# Patient Record
(demographics unavailable — no encounter records)

---

## 2024-12-17 NOTE — END OF VISIT
[] : Resident [FreeTextEntry3] : 35-year-old woman with temporal lobe epilepsy inconsistently taking Keppra with ongoing recurrent relatively infrequent stereotyped spells suggestive of auras/focal seizures.  These could be occurring in the setting of an upper respiratory tract infection and in the setting of medication nonadherence.  She states Keppra causes mild sedation she would like to consider different medication.  We discussed adherence to Keppra 500 mg twice a day for now along with vitamin B6 as the adverse effects for mild along with elective admission to EMU for optimization of antiseizure medications next week.

## 2024-12-17 NOTE — PHYSICAL EXAM
[General Appearance - In No Acute Distress] : in no acute distress [General Appearance - Well Nourished] : well nourished [General Appearance - Well Developed] : well developed [Oriented To Time, Place, And Person] : oriented to person, place, and time [Impaired Insight] : insight and judgment were intact [Affect] : the affect was normal [Memory Recent] : recent memory was not impaired [Person] : oriented to person [Place] : oriented to place [Time] : oriented to time [Concentration Intact] : normal concentrating ability [Visual Intact] : visual attention was ~T not ~L decreased [Naming Objects] : no difficulty naming common objects [Repeating Phrases] : no difficulty repeating a phrase [Writing A Sentence] : no difficulty writing a sentence [Fluency] : fluency intact [Comprehension] : comprehension intact [Reading] : reading intact [Past History] : adequate knowledge of personal past history [Cranial Nerves Optic (II)] : visual acuity intact bilaterally,  visual fields full to confrontation, pupils equal round and reactive to light [Cranial Nerves Oculomotor (III)] : extraocular motion intact [Cranial Nerves Trigeminal (V)] : facial sensation intact symmetrically [Cranial Nerves Facial (VII)] : face symmetrical [Cranial Nerves Vestibulocochlear (VIII)] : hearing was intact bilaterally [Cranial Nerves Glossopharyngeal (IX)] : tongue and palate midline [Cranial Nerves Accessory (XI - Cranial And Spinal)] : head turning and shoulder shrug symmetric [Cranial Nerves Hypoglossal (XII)] : there was no tongue deviation with protrusion [Motor Tone] : muscle tone was normal in all four extremities [Motor Strength] : muscle strength was normal in all four extremities [No Muscle Atrophy] : normal bulk in all four extremities [Sensation Tactile Decrease] : light touch was intact [Abnormal Walk] : normal gait [Balance] : balance was intact [2+] : Ankle jerk left 2+ [Sclera] : the sclera and conjunctiva were normal [PERRL With Normal Accommodation] : pupils were equal in size, round, reactive to light, with normal accommodation [Optic Disc Abnormality] : the optic disc were normal in size and color [Outer Ear] : the ears and nose were normal in appearance [Hearing Threshold Finger Rub Not [place]] : hearing was normal [Examination Of The Oral Cavity] : the lips and gums were normal [Both Tympanic Membranes Were Examined] : both tympanic membranes were normal [Nasal Cavity] : the nasal mucosa and septum were normal [Oropharynx] : the oropharynx was normal [Neck Appearance] : the appearance of the neck was normal [Thyroid Diffuse Enlargement] : the thyroid was not enlarged [Respiration, Rhythm And Depth] : normal respiratory rhythm and effort [Auscultation Breath Sounds / Voice Sounds] : lungs were clear to auscultation bilaterally [Apical Impulse] : the apical impulse was normal [Heart Sounds] : normal S1 and S2 [Arterial Pulses Carotid] : carotid pulses were normal with no bruits [Abdominal Aorta] : the abdominal aorta was normal [Arterial Pulses Femoral] : femoral pulses were normal without bruits [Full Pulse] : the pedal pulses are present [Abdomen Tenderness] : non-tender [] : no hepato-splenomegaly [Past-pointing] : there was no past-pointing [Tremor] : no tremor present [Plantar Reflex Right Only] : normal on the right [Plantar Reflex Left Only] : normal on the left

## 2024-12-17 NOTE — ASSESSMENT
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 Collins and Dr. Lopez, TBI when she was 7 yo, started on 2015 in the context of a fever, recently admitted on [facility], EEG and brain MRI unremarkable during the admission, was on Keppra and recently restarted transiently, 5-7 episodes in entire life, semiology is occipital and holocranial headache, then loss of balance, lightheaded, tunnel vision, and then the whole body rigid and shaking, then patient does not remember anything, then feeling tired) and depression; with PFHx of seizures in her mother (was on medications); is here for a recent admission for seizure-like episode. Admitted to [facility] hospital on August 17th, loss of balance and shaking movements, when she was in the ambulance, then she describes losing consciousness. Patient wasn't sleeping well that day. Patient was discharged home after 4 days after being placed on Keppra during the admission tolerating it well. Apparently, patient was not discharged with home Keppra. Then in her apartment experienced 2 more seizures. Previous seizure in 2019.   # Bitemporal epilepsy - Last event in Aug 2024 and November 2024? Possible having frequent auras.   - re-iterated to c/w Keppra 500 mg BID and compliance  - Start Vitamin B6 250 PO (SE of Keppra) - Seizure precautions discussed - Discussed about EMU admission for ASM optimization (will send paperwork for [facility] EMU for elective admission) - Will follow up in 3 months - Discussed risk of SUDEP (sudden unexplained death in epilepsy) with patient  Seizure Safety Instructions 1. [facility] law mandates you to self-report your seizure disorder to [facility], and be seizure-free for 1yr before you can drive.  2. Avoid swimming, diving, taking a bath, cooking, use of motorized machineries. 3. Avoid climbing a ladder, trees or any height. 4. Use machines with safety switches. 5. Always be aware of your surroundings and make sure family and friends are aware of your seizures. 6. Use non-breakable dishes. 7. Consider wearing a medical bracelet to inform people you have epilepsy in case of an emergency.

## 2024-12-17 NOTE — HISTORY OF PRESENT ILLNESS
[FreeTextEntry1] : 34 yo RHF w PMH  with PMHx with seizure disorder. Last seen in September 2024 recommended AEEG.   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  Since last visit AEEG -24h was done: Focal slowing L posterior temporal region w superimposed epileptiform dc consistent with focal bitemporal epilepsy. Had URI w bronchitis, still having ABx (Amoxicillin), cough meds.   Events: she feels she had 1-2 events in the last weeks of November 2024 when she was having her URI: feels tired, and in one occasion passed out  Very frequent (1-2 times a week) feels some episodes of "hearing things", anxiety, fear and "weird sensation"    Feeling she needs therapist although she goes to [employer] regularly.    Previous chart: TBI when she was 6 y old. Her sz started on 2015 in the context of a fever, recently admitted on [facility], EEG and brain MRI unremarkable during the admission, was on Keppra and recently restarted transiently, 5-7 episodes in entire life, semiology is occipital and holocephalic headache, then loss of balance, lightheaded, tunnel vision, and then the whole body rigid and shaking, then patient does not remember anything, then feeling tired) and depression; with PFHx of seizures in her mother (was on medications) is here for a recent admission for seizure-like episode.. She was taking 500 mg of Keppra but stopped taking Keppra in 2019 (last seizure October 2016). Her sister - may has some seizures as well 2/2 TBI as well.   Non-compliant w Keppra completely she may miss 1-2 days, and last time 4 d ago, and hasn't took it today, usually takes once a day.

## 2025-03-21 NOTE — HISTORY OF PRESENT ILLNESS
[FreeTextEntry1] : 35 yo RHF w PMH  with PMHx with seizure disorder. Last seen in Dec 2024, was admitted to EMU ([facility]) for 5 days, was recorded one event lasting 20 min. w R hand shaking w aphasia w/o EEG correlate w interictal bitemporal sharp waves. Recommended psychiatry consults and MRI brain w seizure protocol. Currently /750. States she is compliant w Keppra BID completely she may miss 1 times, last week.      Previous notes:  Seizures most likely focal temporal epilepsy w focal ideomotor and convulsive events. Since last visit the AEEG -24h was done: Focal slowing L posterior temporal region w superimposed epileptiform dc consistent with focal bitemporal epilepsy. Had URI w bronchitis, still having ABx (Amoxicillin), cough meds.   Events: she feels she had 1-2 events in the last weeks of November 2024 when she was having her URI: feels tired, and in one occasion passed out. Very frequent (1-2 times a week) feels some episodes of "hearing things", anxiety, fear and "weird sensation". Feeling she needs therapist although she goes to [employer] regularly.    Previous chart: TBI when she was 6 y old. Her sz started on 2015 in the context of a fever, recently admitted on [facility], EEG and brain MRI unremarkable during the admission, was on Keppra and recently restarted transiently, 5-7 episodes in entire life, semiology is occipital and holocephalic headache, then loss of balance, lightheaded, tunnel vision, and then the whole body rigid and shaking, then patient does not remember anything, then feeling tired) and depression; with PFHx of seizures in her mother (was on medications) is here for a recent admission for seizure-like episode.. She was taking 500 mg of Keppra but stopped taking Keppra in 2019 (last seizure October 2016). Her sister - may has some seizures as well 2/2 TBI as well.

## 2025-03-21 NOTE — PHYSICAL EXAM
[General Appearance - In No Acute Distress] : in no acute distress [General Appearance - Well Nourished] : well nourished [General Appearance - Well Developed] : well developed [Oriented To Time, Place, And Person] : oriented to person, place, and time [Impaired Insight] : insight and judgment were intact [Affect] : the affect was normal [Memory Recent] : recent memory was not impaired [Person] : oriented to person [Place] : oriented to place [Time] : oriented to time [Concentration Intact] : normal concentrating ability [Visual Intact] : visual attention was ~T not ~L decreased [Naming Objects] : no difficulty naming common objects [Repeating Phrases] : no difficulty repeating a phrase [Writing A Sentence] : no difficulty writing a sentence [Fluency] : fluency intact [Comprehension] : comprehension intact [Reading] : reading intact [Past History] : adequate knowledge of personal past history [Cranial Nerves Optic (II)] : visual acuity intact bilaterally,  visual fields full to confrontation, pupils equal round and reactive to light [Cranial Nerves Oculomotor (III)] : extraocular motion intact [Cranial Nerves Trigeminal (V)] : facial sensation intact symmetrically [Cranial Nerves Facial (VII)] : face symmetrical [Cranial Nerves Vestibulocochlear (VIII)] : hearing was intact bilaterally [Cranial Nerves Glossopharyngeal (IX)] : tongue and palate midline [Cranial Nerves Accessory (XI - Cranial And Spinal)] : head turning and shoulder shrug symmetric [Cranial Nerves Hypoglossal (XII)] : there was no tongue deviation with protrusion [Motor Tone] : muscle tone was normal in all four extremities [Motor Strength] : muscle strength was normal in all four extremities [No Muscle Atrophy] : normal bulk in all four extremities [Sensation Tactile Decrease] : light touch was intact [Abnormal Walk] : normal gait [Balance] : balance was intact [2+] : Ankle jerk left 2+ [Sclera] : the sclera and conjunctiva were normal [PERRL With Normal Accommodation] : pupils were equal in size, round, reactive to light, with normal accommodation [Optic Disc Abnormality] : the optic disc were normal in size and color [Outer Ear] : the ears and nose were normal in appearance [Hearing Threshold Finger Rub Not [place]] : hearing was normal [Examination Of The Oral Cavity] : the lips and gums were normal [Both Tympanic Membranes Were Examined] : both tympanic membranes were normal [Nasal Cavity] : the nasal mucosa and septum were normal [Oropharynx] : the oropharynx was normal [Neck Appearance] : the appearance of the neck was normal [Thyroid Diffuse Enlargement] : the thyroid was not enlarged [Respiration, Rhythm And Depth] : normal respiratory rhythm and effort [Auscultation Breath Sounds / Voice Sounds] : lungs were clear to auscultation bilaterally [Apical Impulse] : the apical impulse was normal [Heart Sounds] : normal S1 and S2 [Arterial Pulses Carotid] : carotid pulses were normal with no bruits [Abdominal Aorta] : the abdominal aorta was normal [Arterial Pulses Femoral] : femoral pulses were normal without bruits [Full Pulse] : the pedal pulses are present [Abdomen Tenderness] : non-tender [] : no hepato-splenomegaly [Past-pointing] : there was no past-pointing [Tremor] : no tremor present [Plantar Reflex Right Only] : normal on the right [Plantar Reflex Left Only] : normal on the left

## 2025-03-21 NOTE — ASSESSMENT
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 Collins and Dr. Lopez, TBI when she was 7 yo, started on 2015 in the context of a fever, recently admitted on [facility], EEG and brain MRI unremarkable during the admission, was on Keppra and recently restarted, 5-7 episodes in entire life, semiology is occipital and holocranial headache, then loss of balance, lightheaded, tunnel vision, and then the whole body rigid and shaking, then patient does not remember anything, then feeling tired) and depression; with PFHx of seizures in her mother (was on medications); is here for a recent admission for seizure-like episode. Admitted to [facility] hospital on August 17th, loss of balance and shaking movements, when she was in the ambulance, then she describes losing consciousness. Patient wasn't sleeping well that day. Patient was discharged home after 4 days after being placed on Keppra during the admission tolerating it well. Apparently, patient was not discharged with home Keppra. Then in her apartment experienced 2 more seizures. Previous seizure in 2019. Recently was admitted to [facility] EMU w captured one non-epileptic event on the settings of bitemporal interictal discharges.   # Bitemporal epilepsy, PNES - Last event in January 2025, frequent PNES, one was recorded on VEEG during last admission Jan 29, 2025. Recommended psychiatry, still have epileptogenic potential on EEG, so we c/w /750, MRI brain w epilepsy protocol.  - c/w Keppra 500 mg AM and 750 mg PM   - Psychiatry and therapy referral - Continue Vitamin B6 250 PO (SE of Keppra) - Will follow up in 3-4 months. (the pt instructed to bring MRI report and CD from [facility]) - if the images suboptimal,  considering repeat MRI brain w epilepsy protocol  - Discussed risk of SUDEP (sudden unexplained death in epilepsy) with patient.   Seizure Safety Instructions 1. [facility] law mandates you to self-report your seizure disorder to [facility], and be seizure-free for 1yr before you can drive.  2. Avoid swimming, diving, taking a bath, cooking, use of motorized machineries. 3. Avoid climbing a ladder, trees or any height. 4. Use machines with safety switches. 5. Always be aware of your surroundings and make sure family and friends are aware of your seizures. 6. Use non-breakable dishes. 7. Consider wearing a medical bracelet to inform people you have epilepsy in case of an emergency.   #Back pain  - PT referral  RTC - 3-4 mo

## 2025-03-21 NOTE — ASSESSMENT
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 Collins and Dr. Lopez, TBI when she was 7 yo, started on 2015 in the context of a fever, recently admitted on [facility], EEG and brain MRI unremarkable during the admission, was on Keppra and recently restarted, 5-7 episodes in entire life, semiology is occipital and holocranial headache, then loss of balance, lightheaded, tunnel vision, and then the whole body rigid and shaking, then patient does not remember anything, then feeling tired) and depression; with PFHx of seizures in her mother (was on medications); is here for a recent admission for seizure-like episode. Admitted to [facility] hospital on August 17th, loss of balance and shaking movements, when she was in the ambulance, then she describes losing consciousness. Patient wasn't sleeping well that day. Patient was discharged home after 4 days after being placed on Keppra during the admission tolerating it well. Apparently, patient was not discharged with home Keppra. Then in her apartment experienced 2 more seizures. Previous seizure in 2019. Recently was admitted to [facility] EMU w captured one non-epileptic event on the settings of bitemporal interictal discharges.   # Bitemporal epilepsy, PNES - Last event in January 2025, frequent PNES, one was recorded on VEEG during last admission Jan 29, 2025. Recommended psychiatry, still have epileptogenic potential on EEG, so we c/w /750, MRI brain w epilepsy protocol.  - c/w Keppra 500 mg AM and 750 mg PM   - Psychiatry and therapy referral - Continue Vitamin B6 250 PO (SE of Keppra) - Will follow up in 3-4 months. (the pt instructed to bring MRI report and CD from [facility]) - if the images suboptimal,  considering repeat MRI brain w epilepsy protocol  - Discussed risk of SUDEP (sudden unexplained death in epilepsy) with patient.   Seizure Safety Instructions 1. [facility] law mandates you to self-report your seizure disorder to [facility], and be seizure-free for 1yr before you can drive.  2. Avoid swimming, diving, taking a bath, cooking, use of motorized machineries. 3. Avoid climbing a ladder, trees or any height. 4. Use machines with safety switches. 5. Always be aware of your surroundings and make sure family and friends are aware of your seizures. 6. Use non-breakable dishes. 7. Consider wearing a medical bracelet to inform people you have epilepsy in case of an emergency.   #Back pain  - PT referral  RTC - 3-4 mo

## 2025-03-21 NOTE — HISTORY OF PRESENT ILLNESS
[FreeTextEntry1] : 37 yo RHF w PMH  with PMHx with seizure disorder. Last seen in Dec 2024, was admitted to EMU ([facility]) for 5 days, was recorded one event lasting 20 min. w R hand shaking w aphasia w/o EEG correlate w interictal bitemporal sharp waves. Recommended psychiatry consults and MRI brain w seizure protocol. Currently /750. States she is compliant w Keppra BID completely she may miss 1 times, last week.      Previous notes:  Seizures most likely focal temporal epilepsy w focal ideomotor and convulsive events. Since last visit the AEEG -24h was done: Focal slowing L posterior temporal region w superimposed epileptiform dc consistent with focal bitemporal epilepsy. Had URI w bronchitis, still having ABx (Amoxicillin), cough meds.   Events: she feels she had 1-2 events in the last weeks of November 2024 when she was having her URI: feels tired, and in one occasion passed out. Very frequent (1-2 times a week) feels some episodes of "hearing things", anxiety, fear and "weird sensation". Feeling she needs therapist although she goes to [employer] regularly.    Previous chart: TBI when she was 6 y old. Her sz started on 2015 in the context of a fever, recently admitted on [facility], EEG and brain MRI unremarkable during the admission, was on Keppra and recently restarted transiently, 5-7 episodes in entire life, semiology is occipital and holocephalic headache, then loss of balance, lightheaded, tunnel vision, and then the whole body rigid and shaking, then patient does not remember anything, then feeling tired) and depression; with PFHx of seizures in her mother (was on medications) is here for a recent admission for seizure-like episode.. She was taking 500 mg of Keppra but stopped taking Keppra in 2019 (last seizure October 2016). Her sister - may has some seizures as well 2/2 TBI as well.

## 2025-03-21 NOTE — PHYSICAL EXAM
[General Appearance - In No Acute Distress] : in no acute distress [General Appearance - Well Nourished] : well nourished [General Appearance - Well Developed] : well developed [Oriented To Time, Place, And Person] : oriented to person, place, and time [Impaired Insight] : insight and judgment were intact [Affect] : the affect was normal [Memory Recent] : recent memory was not impaired [Person] : oriented to person [Place] : oriented to place [Time] : oriented to time [Concentration Intact] : normal concentrating ability [Visual Intact] : visual attention was ~T not ~L decreased [Naming Objects] : no difficulty naming common objects [Repeating Phrases] : no difficulty repeating a phrase [Writing A Sentence] : no difficulty writing a sentence [Fluency] : fluency intact [Comprehension] : comprehension intact [Reading] : reading intact [Past History] : adequate knowledge of personal past history [Cranial Nerves Optic (II)] : visual acuity intact bilaterally,  visual fields full to confrontation, pupils equal round and reactive to light [Cranial Nerves Oculomotor (III)] : extraocular motion intact [Cranial Nerves Trigeminal (V)] : facial sensation intact symmetrically [Cranial Nerves Facial (VII)] : face symmetrical [Cranial Nerves Vestibulocochlear (VIII)] : hearing was intact bilaterally [Cranial Nerves Glossopharyngeal (IX)] : tongue and palate midline [Cranial Nerves Accessory (XI - Cranial And Spinal)] : head turning and shoulder shrug symmetric [Cranial Nerves Hypoglossal (XII)] : there was no tongue deviation with protrusion [Motor Tone] : muscle tone was normal in all four extremities [Motor Strength] : muscle strength was normal in all four extremities [No Muscle Atrophy] : normal bulk in all four extremities [Sensation Tactile Decrease] : light touch was intact [Abnormal Walk] : normal gait [Balance] : balance was intact [2+] : Ankle jerk left 2+ [Sclera] : the sclera and conjunctiva were normal [PERRL With Normal Accommodation] : pupils were equal in size, round, reactive to light, with normal accommodation [Optic Disc Abnormality] : the optic disc were normal in size and color [Outer Ear] : the ears and nose were normal in appearance [Hearing Threshold Finger Rub Not [place]] : hearing was normal [Examination Of The Oral Cavity] : the lips and gums were normal [Both Tympanic Membranes Were Examined] : both tympanic membranes were normal [Nasal Cavity] : the nasal mucosa and septum were normal [Oropharynx] : the oropharynx was normal [Neck Appearance] : the appearance of the neck was normal [Thyroid Diffuse Enlargement] : the thyroid was not enlarged [Respiration, Rhythm And Depth] : normal respiratory rhythm and effort [Auscultation Breath Sounds / Voice Sounds] : lungs were clear to auscultation bilaterally [Apical Impulse] : the apical impulse was normal [Heart Sounds] : normal S1 and S2 [Arterial Pulses Carotid] : carotid pulses were normal with no bruits [Abdominal Aorta] : the abdominal aorta was normal [Arterial Pulses Femoral] : femoral pulses were normal without bruits [Full Pulse] : the pedal pulses are present [Abdomen Tenderness] : non-tender [] : no hepato-splenomegaly [Past-pointing] : there was no past-pointing [Tremor] : no tremor present [Plantar Reflex Right Only] : normal on the right [Plantar Reflex Left Only] : normal on the left

## 2025-04-21 NOTE — PSYCHOSOCIAL ASSESSMENT
[Yes (select details below)] : Have you ever experienced this type of event? Yes [had nightmares about the event(s) or thought about the event(s) when you did not want] : had nightmares and/or unwanted thoughts about the events [tried hard not to think about the event(s) or went out of your way to avoid situations that reminded you of the event] : tried hard to avoid thinking about events or avoid situations that reminded patient of the event [felt numb or detached from people, activities, or your surrounding] : has felt numb or detached from people, activities, or surroundings [felt guilty or unable to stop blaming yourself or others for the event(s) or any problems the event(s) may have caused] : has felt guilty or unable to stop blaming self or others for event(s), or any problems the event(s) may have caused [Client's siblings] : client's siblings [No] : Patient has personal representation (legal guardian, representative payee, conservatorship)? No [has been constantly on guard, watchful, or easily startled] : has not been constantly on guard, watchful, or easily startled [Other: ____] : [unfilled] [FreeTextEntry1] : not working  [FreeTextEntry7] : Laurel

## 2025-04-21 NOTE — PAST MEDICAL HISTORY
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

## 2025-04-21 NOTE — SOCIAL HISTORY
[FreeTextEntry1] : Legal Status  Does individual Served have a Legal Guardian, rep Payee or Conservatorship?  [ x] No  [ ] Yes - Details: [ ]     Legal Involvement history  Does the individual have a history of or current involvement with the legal system?  [ x] No  [ ] Yes - Details: [ ]      Services  Have you ever served in the ?  [ x] No  [ ] Yes - Details: [ ]     Employment history [ ]     Developmental history [ ] no issues    Sexual hx/identity Sexual History/ Concern (include sexual orientation and other relevant information)  [ ]   heterosexual Race - ethnicity - culture information [ ]  Kazakh     Social supports (friends, Volunteers, club, AA meeting, other meetings ) ? [ ] family    Meaningful Activities: [ ] music       Spiritual Assessment Tool - ABELARDO BORJA What is your gabe or belief? [ ] Pentecostalism/Yarsani  Do you consider yourself spiritual or Jainism? [ ] both Is there something you believe in that gives meaning to your life? [ ] god I: Is it important in your life? [ ] yes What influence does it have on how you take care of yourself? [ ] god How have your beliefs influenced your behavior during this illness? What role do your beliefs play in regaining your health? [ ] dont know C. Are you part of a spiritual or Jainism community? [ ] yes Is this of support to you and how? [ ] yes, i also pray daily Is there a person or group of people you really love or who are really important to you? [ ] family and some friends H. We have been discussing your belief and supports. What else gives you internal support?[ ] god What are your sources of hope, strength, comfort and peace? [ ] god What do you hold on to during difficult times? [ ] god what sustains you and keeps you going? [ ] god A. How would you like me, your healthcare provider, to address these issues in your healthcare? [ ]   " i want to get better"  SMOKING CESSATION  Do you Smoke?                              [ ] Yes [ x] No  Do you want to quit? [ ] Yes [ ] No  -ASK   Number of cigatettes   [ ] cigars [ ]  pipe bowls [ ]  per day   Number of ST cans/ pouches per week [ ]   Number of years used [ ]   How soon after you wake up do you use tobacco?  [ ] within 30 minutes      [ ] more than 30 minutes   Previous quit attempts:  # of attempts [ ] longest quit period [ ] methods(s) used [ ]  How long ago was last attempt to quit  [ ] years [ ] months   Reasons for wanting to quit[ ]  -ADVISE   about the oral benefits of quitting  -ASSESS   willingness to make a quit attempt (Stage of Change)     [ ] Precontemplation (Stop here & Reassess next visit) [ ] Contemplation [ ] Preparation    -ASSIST    (depending on stage of change)   Self-Help Pamphlets & Materials   List of local community group/individual quit programs and phone help lines   Encourage a quit date (for those who are ready)   Pharmacotherapy: Nicotine gum/ Lozenge/ Patch/ Inhaler/NS/Zyban/ Chantix    RX: [ ]  ()  -ARRANGE  Follow up if set a quit date (with permission)  Quit Date: [ ]  Phone calls or visits:  [ ] Week 1-2  Months   [ ] 1   [ ] 3   [ ] 6   [ ] 12    -Yearly Reassessment    [ ] No Changes of Smoking [ ] Change of Smoking Habit (Non-Smoker to Smoker/ Smoker to Non Smoker)  (If there is change from Non Smoker to Smoker, please fill out new BRIEF TOBACCO CESSATION INTERVENTION FORM)  Date of Yearly Reassessment : [ ]  Comments:  [ ]

## 2025-04-21 NOTE — FAMILY HISTORY
[FreeTextEntry1] : Family composition: [ ] The patient resides with her sister Family history and background [ ] the patient's parents resided in [place]; the patient lives with sister and has two sisters and brother.  Family relationship [ ] ok The patient is very close to her family.  Pertinent Family Medical, MH and Substance Use History including Adult Child of Alcoholic and child of substance abuse status; history of cancer and heart disease none
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
## 2025-04-21 NOTE — PHYSICAL EXAM
[6 - Severely ill] : 6 - Severely ill  (disruptive pathology, behavior and function are frequently influenced by symptoms, may require assistance from others) [Individual reports tobacco use during the last 30 days?] : Individual reports tobacco use during the last 30 days? No [Individual reports use of the following tobacco products during the last 30 days?] : Individual reports use of the following tobacco products during the last 30 days? No [Individual reports current use of tobacco cessation medication or nicotine replacement therapy?] : Individual reports current use of tobacco cessation medication or nicotine replacement therapy? No [Was tobacco cessation medication and/or nicotine replacement therapy recommended?] : Was tobacco cessation medication and/or nicotine replacement therapy recommended? No [Does individual accept referral to MD for cessation medication or NRT?] : Does individual accept referral to MD for cessation medication or NRT? No
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
## 2025-04-21 NOTE — HISTORY OF PRESENT ILLNESS
[FreeTextEntry1] : The patient stated that when she was 5 she fell and was take to the hospital for a " head surgery" that was back in [place]
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
## 2025-04-21 NOTE — REASON FOR VISIT
[Not Applicable] : Not Applicable [Patient] : Patient [FreeTextEntry4] : 12pm [Number can be texted] : number can be texted [OK  to leave message] : OK  to leave message [FreeTextEntry5] : English [FreeTextEntry2] : depression, anxiety and panic attacks [FreeTextEntry1] : depression, anxiety, panic attacks, poor focus and memory

## 2025-04-21 NOTE — SOCIAL HISTORY
[FreeTextEntry1] : Legal Status  Does individual Served have a Legal Guardian, rep Payee or Conservatorship?  [ x] No  [ ] Yes - Details: [ ]     Legal Involvement history  Does the individual have a history of or current involvement with the legal system?  [ x] No  [ ] Yes - Details: [ ]      Services  Have you ever served in the ?  [ x] No  [ ] Yes - Details: [ ]     Employment history [ ]     Developmental history [ ] no issues    Sexual hx/identity Sexual History/ Concern (include sexual orientation and other relevant information)  [ ]   heterosexual Race - ethnicity - culture information [ ]  Algerian     Social supports (friends, Volunteers, club, AA meeting, other meetings ) ? [ ] family    Meaningful Activities: [ ] music       Spiritual Assessment Tool - ABELARDO BORJA What is your gabe or belief? [ ] Religious/Church  Do you consider yourself spiritual or Taoist? [ ] both Is there something you believe in that gives meaning to your life? [ ] god I: Is it important in your life? [ ] yes What influence does it have on how you take care of yourself? [ ] god How have your beliefs influenced your behavior during this illness? What role do your beliefs play in regaining your health? [ ] dont know C. Are you part of a spiritual or Taoist community? [ ] yes Is this of support to you and how? [ ] yes, i also pray daily Is there a person or group of people you really love or who are really important to you? [ ] family and some friends H. We have been discussing your belief and supports. What else gives you internal support?[ ] god What are your sources of hope, strength, comfort and peace? [ ] god What do you hold on to during difficult times? [ ] god what sustains you and keeps you going? [ ] god A. How would you like me, your healthcare provider, to address these issues in your healthcare? [ ]   " i want to get better"  SMOKING CESSATION  Do you Smoke?                              [ ] Yes [ x] No  Do you want to quit? [ ] Yes [ ] No  -ASK   Number of cigatettes   [ ] cigars [ ]  pipe bowls [ ]  per day   Number of ST cans/ pouches per week [ ]   Number of years used [ ]   How soon after you wake up do you use tobacco?  [ ] within 30 minutes      [ ] more than 30 minutes   Previous quit attempts:  # of attempts [ ] longest quit period [ ] methods(s) used [ ]  How long ago was last attempt to quit  [ ] years [ ] months   Reasons for wanting to quit[ ]  -ADVISE   about the oral benefits of quitting  -ASSESS   willingness to make a quit attempt (Stage of Change)     [ ] Precontemplation (Stop here & Reassess next visit) [ ] Contemplation [ ] Preparation    -ASSIST    (depending on stage of change)   Self-Help Pamphlets & Materials   List of local community group/individual quit programs and phone help lines   Encourage a quit date (for those who are ready)   Pharmacotherapy: Nicotine gum/ Lozenge/ Patch/ Inhaler/NS/Zyban/ Chantix    RX: [ ]  ()  -ARRANGE  Follow up if set a quit date (with permission)  Quit Date: [ ]  Phone calls or visits:  [ ] Week 1-2  Months   [ ] 1   [ ] 3   [ ] 6   [ ] 12    -Yearly Reassessment    [ ] No Changes of Smoking [ ] Change of Smoking Habit (Non-Smoker to Smoker/ Smoker to Non Smoker)  (If there is change from Non Smoker to Smoker, please fill out new BRIEF TOBACCO CESSATION INTERVENTION FORM)  Date of Yearly Reassessment : [ ]  Comments:  [ ]
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
## 2025-04-28 NOTE — DISCUSSION/SUMMARY
[FreeTextEntry1] : The patient missed her initial intake appointment with the psychiatrist, and it was rescheduled. The therapist contacted the patient to remind her of her virtual therapy session tomorrow, 4/29/25 at 11 am. The patient didn't  her phone, the therapist left a voicemail with the contact info as well as her options for tomorrow. The therapist also read the SW note in regard to possible metro cards for her MH appointments. The therapist spoke to the , unfortunately the patient is not eligible for metro cards since she is not a Medicaid patient.

## 2025-04-30 NOTE — PLAN
[FreeTextEntry2] : will develop during the upcoming sessions  [Cognitive Processing Therapy] : Cognitive Processing Therapy  [Interpersonal Therapy] : Interpersonal Therapy  [Motivational Interviewing] : Motivational Interviewing  [Psychodynamic Therapy] : Psychodynamic Therapy  [Psychoeducation] : Psychoeducation  [Skills training (all types)] : Skills training (all types)  [Supportive Therapy] : Supportive Therapy [de-identified] : The patient came in person for the session. The patient reported that the reason she missed the session as well as her psych intake was becuse she got confused. The patient has a hard time focusing, has to stop and look for words and feels uncomfortable while she can't express herself. The patient also spoke about her younger years and the fact that she never finished high school, was taking classes to get to retake the exams but didn't finish it either. One of the patient's goals is to get her GED. Also, the patient tried to go back to work for a day and stated that could only do a couple of hours because she felt dizzy and weak. In addtion, the patient spoke about her expectations and irritability, gave an example of texting someone and not getting text back as fast as she expected. The patient was very upset that the person didn't text her back. Additionally, the patient worries that her brother is drinking alcohol, and she thinks that her sister's relationship with her spouse is in trouble. During the session the therapist and the patient discussed ways for the patient to improve her memory, the therapist will provide printed homework for the patient to complete. The patient takes medications as prescribed. Throughout the session the patient was provided with active listening, motivational interviewing, emotional support and empathy.  [Recommended Frequency of Visits: ____] : Recommended frequency of visits: [unfilled] [Return in ____ week(s)] : Return in [unfilled] week(s) [FreeTextEntry1] : the therapist will see the patient every 2-3 weeks

## 2025-04-30 NOTE — PLAN
[FreeTextEntry2] : will develop during the upcoming sessions  [Cognitive Processing Therapy] : Cognitive Processing Therapy  [Interpersonal Therapy] : Interpersonal Therapy  [Motivational Interviewing] : Motivational Interviewing  [Psychodynamic Therapy] : Psychodynamic Therapy  [Psychoeducation] : Psychoeducation  [Skills training (all types)] : Skills training (all types)  [Supportive Therapy] : Supportive Therapy [de-identified] : The patient came in person for the session. The patient reported that the reason she missed the session as well as her psych intake was becuse she got confused. The patient has a hard time focusing, has to stop and look for words and feels uncomfortable while she can't express herself. The patient also spoke about her younger years and the fact that she never finished high school, was taking classes to get to retake the exams but didn't finish it either. One of the patient's goals is to get her GED. Also, the patient tried to go back to work for a day and stated that could only do a couple of hours because she felt dizzy and weak. In addtion, the patient spoke about her expectations and irritability, gave an example of texting someone and not getting text back as fast as she expected. The patient was very upset that the person didn't text her back. Additionally, the patient worries that her brother is drinking alcohol, and she thinks that her sister's relationship with her spouse is in trouble. During the session the therapist and the patient discussed ways for the patient to improve her memory, the therapist will provide printed homework for the patient to complete. The patient takes medications as prescribed. Throughout the session the patient was provided with active listening, motivational interviewing, emotional support and empathy.  [Recommended Frequency of Visits: ____] : Recommended frequency of visits: [unfilled] [Return in ____ week(s)] : Return in [unfilled] week(s) [FreeTextEntry1] : the therapist will see the patient every 2-3 weeks

## 2025-04-30 NOTE — REASON FOR VISIT
[FreeTextEntry4] : 11am [FreeTextEntry5] : [id number] [Patient] : Patient [FreeTextEntry1] : therapy f/u

## 2025-04-30 NOTE — REASON FOR VISIT
[FreeTextEntry4] : 11am [FreeTextEntry5] : [id number] [Patient] : Patient [FreeTextEntry1] : therapy f/u

## 2025-05-01 NOTE — RISK ASSESSMENT
[Clinical Interview] : Clinical Interview [No] : No [Mood disorder] : mood disorder [Severe anxiety, agitation or panic] : severe anxiety, agitation or panic [Chronic pain/other acute medical condition] : chronic pain or other acute medical condition [Identifies reasons for living] : identifies reasons for living [Yazdanism beliefs] : Church beliefs [Cultural, spiritual and/or moral attitudes against suicide] : cultural, spiritual and/or moral attitudes against suicide [None Known] : none known [No known risk factors] : No known risk factors [Residential stability] : residential stability [Sobriety] : sobriety

## 2025-05-01 NOTE — RISK ASSESSMENT
[Clinical Interview] : Clinical Interview [No] : No [Mood disorder] : mood disorder [Severe anxiety, agitation or panic] : severe anxiety, agitation or panic [Chronic pain/other acute medical condition] : chronic pain or other acute medical condition [Identifies reasons for living] : identifies reasons for living [Lutheran beliefs] : Cheondoism beliefs [Cultural, spiritual and/or moral attitudes against suicide] : cultural, spiritual and/or moral attitudes against suicide [None Known] : none known [No known risk factors] : No known risk factors [Residential stability] : residential stability [Sobriety] : sobriety

## 2025-05-01 NOTE — RISK ASSESSMENT
[Clinical Interview] : Clinical Interview [No] : No [Mood disorder] : mood disorder [Severe anxiety, agitation or panic] : severe anxiety, agitation or panic [Chronic pain/other acute medical condition] : chronic pain or other acute medical condition [Identifies reasons for living] : identifies reasons for living [Rastafari beliefs] : Sabianism beliefs [Cultural, spiritual and/or moral attitudes against suicide] : cultural, spiritual and/or moral attitudes against suicide [None Known] : none known [No known risk factors] : No known risk factors [Residential stability] : residential stability [Sobriety] : sobriety

## 2025-05-02 NOTE — DISCUSSION/SUMMARY
[Low acute suicide risk] : Low acute suicide risk [No] : No [FreeTextEntry1] : Writer left VM letting pt know that given her history of stroke, she'd benefit from follow up with PCP to determine if she has an underlying clotting disorder.

## 2025-05-02 NOTE — REVIEW OF SYSTEMS
[Negative] : Allergic/Immunologic [de-identified] : occasional dizziness, seizures [de-identified] : see note

## 2025-05-02 NOTE — SOCIAL HISTORY
[FreeTextEntry1] : Has two sisters, she is the youngest. 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 Was previously taking classes at a college to finish her high school degree but stopped 2/2 worsening anxiety. Plays guOptifreezer for fun. Has friends and supportive colleagues at work. Self identifies as Church. Originally from [place]. Currently single, no dependents, last had a long term relationship 3 years ago.

## 2025-05-02 NOTE — PHYSICAL EXAM
[None] : none [Well groomed] : well groomed [Cooperative] : cooperative [Anxious] : anxious [Full] : full [Clear] : clear [Linear/Goal Directed] : linear/goal directed [Preoccupations/Ruminations] : preoccupations/ruminations [None Reported] : none reported [Average] : average [WNL] : within normal limits [FreeTextEntry1] : Slender [FreeTextEntry5] : Pleasant [de-identified] : Possible deficits in short term memory and attention/concentration

## 2025-05-02 NOTE — END OF VISIT
[] : Resident [Resident] : Resident [FreeTextEntry3] : Pt is seen and evaluated with resident . Pt has very complicated PMH (CVA, TBI, seizures , etc). Pt denies SI/HI/AH. Treatment plan was discussed

## 2025-05-02 NOTE — HISTORY OF PRESENT ILLNESS
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  Presenting concern: "I worry a lot" Patient presents at appointment well groomed, is pleasant, and euthymic appearing. She states that she is here after being referred by neurology. She states that In this past year she's had multiple hospitalizations for seizures, and that these have become a big worry of hers. In particular, she notes that she has a prodromal symptoms of feeling dizzy, and sometimes losing balance before a seizures. She describes an increase in anxiety when she now starts noticing feeling dizzy, accompanied by palpitations, difficulty breathing, and a loss of sensation in her hands and legs. These episodes often last 30-60 minutes, and can occur from every other week to as much as twice a week. Sometimes she can calm herself down again by doing breathing exercises. She states that because of these attacks she has stopped going to school, stopped socializing as frequently, and stopped playing music for a while. In general, she feels like she's always been an anxious person and "I worry a lot", frequently about her health or her family. She notes feelings of low energy, disrupted sleep, weight loss of 10-15 pounds (from 120 to 109 in the past 4 years. She states previously she had frequent crying episodes but that these now have improved. She denies any current feelings of depression, though she states "I have up days and down days".   Patient denies any current or past history consistent with mic or psychosis. Denies any SI or HI. No weapons at home. [FreeTextEntry2] : PPHx of DEANNA and MDD in remission with one prior IPP at age 26 for suicidality, history of being in outpatient treatment a few years ago and treated with Zoloft 100 mg, no history of suicide attempts [FreeTextEntry3] : Zoloft 100 mg

## 2025-05-02 NOTE — REVIEW OF SYSTEMS
[Negative] : Allergic/Immunologic [de-identified] : occasional dizziness, seizures [de-identified] : see note

## 2025-05-02 NOTE — PHYSICAL EXAM
[None] : none [Well groomed] : well groomed [Cooperative] : cooperative [Anxious] : anxious [Full] : full [Clear] : clear [Linear/Goal Directed] : linear/goal directed [Preoccupations/Ruminations] : preoccupations/ruminations [None Reported] : none reported [Average] : average [WNL] : within normal limits [FreeTextEntry1] : Slender [FreeTextEntry5] : Pleasant [de-identified] : Possible deficits in short term memory and attention/concentration

## 2025-05-02 NOTE — REVIEW OF SYSTEMS
[Negative] : Allergic/Immunologic [de-identified] : occasional dizziness, seizures [de-identified] : see note

## 2025-05-02 NOTE — SOCIAL HISTORY
[FreeTextEntry1] : Has two sisters, she is the youngest. 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 Was previously taking classes at a college to finish her high school degree but stopped 2/2 worsening anxiety. Plays gu[employer] for fun. Has friends and supportive colleagues at work. Self identifies as Synagogue. Originally from [place]. Currently single, no dependents, last had a long term relationship 3 years ago.

## 2025-05-02 NOTE — PHYSICAL EXAM
[None] : none [Well groomed] : well groomed [Cooperative] : cooperative [Anxious] : anxious [Full] : full [Clear] : clear [Linear/Goal Directed] : linear/goal directed [Preoccupations/Ruminations] : preoccupations/ruminations [None Reported] : none reported [Average] : average [WNL] : within normal limits [FreeTextEntry1] : Slender [FreeTextEntry5] : Pleasant [de-identified] : Possible deficits in short term memory and attention/concentration

## 2025-05-02 NOTE — SOCIAL HISTORY
[FreeTextEntry1] : Has two sisters, she is the youngest. 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 Was previously taking classes at a college to finish her high school degree but stopped 2/2 worsening anxiety. Plays gu[employer] for fun. Has friends and supportive colleagues at work. Self identifies as Synagogue. Originally from [place]. Currently single, no dependents, last had a long term relationship 3 years ago.

## 2025-05-06 NOTE — PLAN
[FreeTextEntry2] : will develop during the upcoming sessions  [Cognitive Processing Therapy] : Cognitive Processing Therapy  [Interpersonal Therapy] : Interpersonal Therapy  [Motivational Interviewing] : Motivational Interviewing  [Psychodynamic Therapy] : Psychodynamic Therapy  [Psychoeducation] : Psychoeducation  [Skills training (all types)] : Skills training (all types)  [Supportive Therapy] : Supportive Therapy [de-identified] : The patient came in person for the session. The patient reported danie she will be about 10-15 min late because of the transportation issues. Also, the patient stated that she received a voicemail from the psychiatrist but was not sure what the voicemail was all about. The therapist got in touch with the psychiatrist and passed the message to the patient to possibly see a PCP for blood work since the patient had a stroke at the young age, also the psychiatrist direct number was provided to the patient. The patient continues to have a hard time explaining herself, during the conversation has to stop and look for words. Also, the patient continues to report dizziness and shared that last week worked one day and " i was losing my balance" During the session discussed assumptions, interpretations, the patient's stressors such as finances, and irritability while talks on the phone with parents.  The patient also reported that when she is sad, she can watch funny videos, check her phone, walk in the community, go to [place], just to name a few. The patient takes medications as prescribed. Throughout the session the patient was provided with active listening, motivational interviewing, emotional support and empathy.  [Recommended Frequency of Visits: ____] : Recommended frequency of visits: [unfilled] [Return in ____ week(s)] : Return in [unfilled] week(s) [FreeTextEntry1] : the therapist will see the patient every 2-3 weeks

## 2025-05-14 NOTE — PLAN
[FreeTextEntry2] : will develop during the upcoming sessions  [Cognitive Processing Therapy] : Cognitive Processing Therapy  [Interpersonal Therapy] : Interpersonal Therapy  [Motivational Interviewing] : Motivational Interviewing  [Psychodynamic Therapy] : Psychodynamic Therapy  [Psychoeducation] : Psychoeducation  [Skills training (all types)] : Skills training (all types)  [Supportive Therapy] : Supportive Therapy [de-identified] : The therapist met the patient in person for the session. The patient continues to get p/t, reported occasional dizziness, and problem sleeping, last night the patient only slept 2,5 hours. The therapist and the patient discussed sleep hygiene and the fact that the patient needs to talk to her psychiatrist about her sleep pattern. The patient also shared that when she was 16 y/o she also had insomnia for about 6 months however the patient was unable to identify any reasons/stressors that could possibly contribute to her insomnia. During the session the therapist and the patient discussed acceptance, expectations, negative thoughts just to name a few. The therapist provided the patient with an exercise that might help the patient with her memory. The patient takes medications as prescribed. Throughout the session the patient was provided with active listening, motivational interviewing, emotional support and empathy.  [Recommended Frequency of Visits: ____] : Recommended frequency of visits: [unfilled] [Return in ____ week(s)] : Return in [unfilled] week(s) [FreeTextEntry1] : the therapist will see the patient every 2-3 weeks

## 2025-05-14 NOTE — PLAN
[FreeTextEntry2] : will develop during the upcoming sessions  [Cognitive Processing Therapy] : Cognitive Processing Therapy  [Interpersonal Therapy] : Interpersonal Therapy  [Motivational Interviewing] : Motivational Interviewing  [Psychodynamic Therapy] : Psychodynamic Therapy  [Psychoeducation] : Psychoeducation  [Skills training (all types)] : Skills training (all types)  [Supportive Therapy] : Supportive Therapy [de-identified] : The therapist met the patient in person for the session. The patient continues to get p/t, reported occasional dizziness, and problem sleeping, last night the patient only slept 2,5 hours. The therapist and the patient discussed sleep hygiene and the fact that the patient needs to talk to her psychiatrist about her sleep pattern. The patient also shared that when she was 16 y/o she also had insomnia for about 6 months however the patient was unable to identify any reasons/stressors that could possibly contribute to her insomnia. During the session the therapist and the patient discussed acceptance, expectations, negative thoughts just to name a few. The therapist provided the patient with an exercise that might help the patient with her memory. The patient takes medications as prescribed. Throughout the session the patient was provided with active listening, motivational interviewing, emotional support and empathy.  [Recommended Frequency of Visits: ____] : Recommended frequency of visits: [unfilled] [Return in ____ week(s)] : Return in [unfilled] week(s) [FreeTextEntry1] : the therapist will see the patient every 2-3 weeks

## 2025-05-20 NOTE — DISCUSSION/SUMMARY
[Initial Plan] : Initial Plan [Attempting to realize their potential] : Attempting to realize their potential [Motivated to participate in treatment] : motivated to participate in treatment [Motivated to maintain or improve physical health] : motivated to maintain or improve physical health [Has vocational interests or hobbies] : has vocational interests or hobbies [Involved in cultural/spiritual/Oriental orthodox/community activities] : involved in cultural/spiritual/Oriental orthodox/community activities [Housing stability] : housing stability [English fluency] : English fluency [Connected to healthcare] : connected to healthcare [FreeTextEntry2] : 6/1/26 [FreeTextEntry3] : 5/1/25 [FreeTextEntry8] : none [Mental Health] : Mental Health [Initial] : Initial [every ___ months] : every [unfilled] months [every ___ weeks] : every [unfilled] weeks [Other rationale for transition/discharge:] : Other rationale for transition/discharge: [None - Reason others did not participate:] : None - Reason others did not participate:  [Yes] : Yes [Psychiatric Provider/Prescriber] : Psychiatric Provider/Prescriber [Therapist] : Therapist [Supervisor (if needed)] : Supervisor [FreeTextEntry1] : depression, anxiety, forgetfulness  [FreeTextEntry4] : Goal 1. The patient will recognize and improve daily symptoms of her anxiety and depression as evidenced by an improvement in scores on mental health scales reviewed in therapy sessions every 1 year. Also, the therapist and the patient will be working on the patient's planning.  [de-identified] : The patient will attend biweekly therapy sessions and will review symptoms and coping skills during sessions.   [de-identified] : 5/1/26 [de-identified] : The patient will take daily meds as prescribed and meet with psychiatrist as scheduled  [de-identified] : 5/1/26 [FreeTextEntry5] : The therapist will utilize CBT techniques, Psychoeducation, Motivational interviewing and Supportive therapy  [de-identified] : Dr. Enamorado [de-identified] : Abdon [de-identified] : When a patient no longer requires individual psychotherapy and medication in order to perform at baseline, the patient will be discharged.    [de-identified] : not clinically necessary

## 2025-05-20 NOTE — DISCUSSION/SUMMARY
[Initial Plan] : Initial Plan [Attempting to realize their potential] : Attempting to realize their potential [Motivated to participate in treatment] : motivated to participate in treatment [Motivated to maintain or improve physical health] : motivated to maintain or improve physical health [Has vocational interests or hobbies] : has vocational interests or hobbies [Involved in cultural/spiritual/Pentecostal/community activities] : involved in cultural/spiritual/Pentecostal/community activities [Housing stability] : housing stability [English fluency] : English fluency [Connected to healthcare] : connected to healthcare [FreeTextEntry2] : 6/1/26 [FreeTextEntry3] : 5/1/25 [FreeTextEntry8] : none [Mental Health] : Mental Health [Initial] : Initial [every ___ months] : every [unfilled] months [every ___ weeks] : every [unfilled] weeks [Other rationale for transition/discharge:] : Other rationale for transition/discharge: [None - Reason others did not participate:] : None - Reason others did not participate:  [Yes] : Yes [Psychiatric Provider/Prescriber] : Psychiatric Provider/Prescriber [Therapist] : Therapist [Supervisor (if needed)] : Supervisor [FreeTextEntry1] : depression, anxiety, forgetfulness  [FreeTextEntry4] : Goal 1. The patient will recognize and improve daily symptoms of her anxiety and depression as evidenced by an improvement in scores on mental health scales reviewed in therapy sessions every 1 year. Also, the therapist and the patient will be working on the patient's planning.  [de-identified] : The patient will attend biweekly therapy sessions and will review symptoms and coping skills during sessions.   [de-identified] : 5/1/26 [de-identified] : The patient will take daily meds as prescribed and meet with psychiatrist as scheduled  [de-identified] : 5/1/26 [FreeTextEntry5] : The therapist will utilize CBT techniques, Psychoeducation, Motivational interviewing and Supportive therapy  [de-identified] : Dr. Enamorado [de-identified] : Abdon [de-identified] : When a patient no longer requires individual psychotherapy and medication in order to perform at baseline, the patient will be discharged.    [de-identified] : not clinically necessary

## 2025-05-20 NOTE — PLAN
[FreeTextEntry2] : Goal 1. The patient will recognize and improve daily symptoms of her anxiety and depression as evidenced by an improvement in scores on mental health scales reviewed in therapy sessions every 1 year. Also, the therapist and the patient will be working on the patient's planning.  Obj 1- The patient will attend biweekly therapy sessions and will review symptoms and coping skills during sessions.   Obj 2- The patient will take daily meds as prescribed and meet with psychiatrist as scheduled  [Cognitive Processing Therapy] : Cognitive Processing Therapy  [Interpersonal Therapy] : Interpersonal Therapy  [Motivational Interviewing] : Motivational Interviewing  [Psychodynamic Therapy] : Psychodynamic Therapy  [Psychoeducation] : Psychoeducation  [Skills training (all types)] : Skills training (all types)  [Supportive Therapy] : Supportive Therapy [de-identified] : The therapist met the patient via telehealth. The patient was about 10 minutes late as she had a hard time signing in to solo. The patient reported that her mood was ok, however she feels tired and stressed out by the fact that she received a lot of medical bills and she is trying to manage to pay them all off since her medical services were not covered by emergency Medicaid as per the patient. The patient also shared that once she paid 700.00 dollars even though the patient had Medicaid and was not working. When the therapist informed the patient that all her medical bill were supposed to be covered by her emergency Medicaid the patient was confused stating that some laws have been changed and she is not eligible. The therapist asked the patient to bring all of her bills for her next therapy session so that the therapist would be able to make some calls while the patient is in the office. The patient was very pleased with the plan. Also, the patient shared that she is very forgetful and therefore is not organized. The therapist and the patient discussed planning, using "to do list" and wall calendar. In addition, the therapist and the patient discussed the patient's treatment plan goals. The patient would like to work on her mood and to be more organized. The patient will be using "hybrid" in person and telehealth services. The patient takes medications as prescribed. Throughout the session the patient was provided with active listening, motivational interviewing, emotional support and empathy.  [Recommended Frequency of Visits: ____] : Recommended frequency of visits: [unfilled] [Return in ____ week(s)] : Return in [unfilled] week(s) [FreeTextEntry1] : the therapist will see the patient every 2-3 weeks

## 2025-05-20 NOTE — PLAN
[FreeTextEntry2] : Goal 1. The patient will recognize and improve daily symptoms of her anxiety and depression as evidenced by an improvement in scores on mental health scales reviewed in therapy sessions every 1 year. Also, the therapist and the patient will be working on the patient's planning.  Obj 1- The patient will attend biweekly therapy sessions and will review symptoms and coping skills during sessions.   Obj 2- The patient will take daily meds as prescribed and meet with psychiatrist as scheduled  [Cognitive Processing Therapy] : Cognitive Processing Therapy  [Interpersonal Therapy] : Interpersonal Therapy  [Motivational Interviewing] : Motivational Interviewing  [Psychodynamic Therapy] : Psychodynamic Therapy  [Psychoeducation] : Psychoeducation  [Skills training (all types)] : Skills training (all types)  [Supportive Therapy] : Supportive Therapy [de-identified] : The therapist met the patient via telehealth. The patient was about 10 minutes late as she had a hard time signing in to solo. The patient reported that her mood was ok, however she feels tired and stressed out by the fact that she received a lot of medical bills and she is trying to manage to pay them all off since her medical services were not covered by emergency Medicaid as per the patient. The patient also shared that once she paid 700.00 dollars even though the patient had Medicaid and was not working. When the therapist informed the patient that all her medical bill were supposed to be covered by her emergency Medicaid the patient was confused stating that some laws have been changed and she is not eligible. The therapist asked the patient to bring all of her bills for her next therapy session so that the therapist would be able to make some calls while the patient is in the office. The patient was very pleased with the plan. Also, the patient shared that she is very forgetful and therefore is not organized. The therapist and the patient discussed planning, using "to do list" and wall calendar. In addition, the therapist and the patient discussed the patient's treatment plan goals. The patient would like to work on her mood and to be more organized. The patient will be using "hybrid" in person and telehealth services. The patient takes medications as prescribed. Throughout the session the patient was provided with active listening, motivational interviewing, emotional support and empathy.  [Recommended Frequency of Visits: ____] : Recommended frequency of visits: [unfilled] [Return in ____ week(s)] : Return in [unfilled] week(s) [FreeTextEntry1] : the therapist will see the patient every 2-3 weeks

## 2025-05-29 NOTE — DISCUSSION/SUMMARY
[Low acute suicide risk] : Low acute suicide risk [No] : No [FreeTextEntry1] : Patient's chronic risk factors for harm to self include an anxious temperament, history of DEANNA, history of seizure disorder and stroke. Her modifiable risk factors include current anxiety with panic attacks, financial difficulties, sleep difficulties. Protective factors include her Caodaism, family connections, having supportive colleagues, being future oriented, and being treatment seeking.

## 2025-05-29 NOTE — SOCIAL HISTORY
[FreeTextEntry1] : Has two sisters, she is the youngest. 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 Was previously taking classes at a college to finish her high school degree but stopped 2/2 worsening anxiety. Plays gu[employer]achtree Village [employer] Instit[employer] for fun. Has friends and supportive colleagues at work. Self identifies as Yarsani. Originally from [place]. Currently single, no dependents, last had a long term relationship 3 years ago.

## 2025-05-29 NOTE — END OF VISIT
[FreeTextEntry3] : Pt is seen and evaluated with resident . Pt has very complicated PMH (CVA, TBI, seizures , etc). Pt denies SI/HI/AH. Treatment plan was discussed

## 2025-05-29 NOTE — RISK ASSESSMENT
[Clinical Interview] : Clinical Interview [No] : No [Mood disorder] : mood disorder [Severe anxiety, agitation or panic] : severe anxiety, agitation or panic [Chronic pain/other acute medical condition] : chronic pain or other acute medical condition [Identifies reasons for living] : identifies reasons for living [Judaism beliefs] : Mandaen beliefs [Cultural, spiritual and/or moral attitudes against suicide] : cultural, spiritual and/or moral attitudes against suicide [None Known] : none known [No known risk factors] : No known risk factors [Residential stability] : residential stability [Sobriety] : sobriety

## 2025-05-29 NOTE — HISTORY OF PRESENT ILLNESS
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  Presenting concern: "I worry a lot" Patient presents at appointment well groomed, is pleasant, and euthymic appearing. She states that she is here after being referred by neurology. She states that In this past year she's had multiple hospitalizations for seizures, and that these have become a big worry of hers. In particular, she notes that she has a prodromal symptoms of feeling dizzy, and sometimes losing balance before a seizures. She describes an increase in anxiety when she now starts noticing feeling dizzy, accompanied by palpitations, difficulty breathing, and a loss of sensation in her hands and legs. These episodes often last 30-60 minutes, and can occur from every other week to as much as twice a week. Sometimes she can calm herself down again by doing breathing exercises. She states that because of these attacks she has stopped going to school, stopped socializing as frequently, and stopped playing music for a while. In general, she feels like she's always been an anxious person and "I worry a lot", frequently about her health or her family. She notes feelings of low energy, disrupted sleep, weight loss of 10-15 pounds (from 120 to 109 in the past 4 years. She states previously she had frequent crying episodes but that these now have improved. She denies any current feelings of depression, though she states "I have up days and down days".   Patient denies any current or past history consistent with mic or psychosis. Denies any SI or HI. No weapons at home. [FreeTextEntry2] : PPHx of DEANNA and MDD in remission with one prior IPP at age 26 for suicidality, history of being in outpatient treatment a few years ago and treated with Zoloft 100 mg, no history of suicide attempts [FreeTextEntry3] : Zoloft 100 mg

## 2025-05-29 NOTE — PHYSICAL EXAM
[None] : none [Well groomed] : well groomed [Cooperative] : cooperative [Full] : full [Clear] : clear [Linear/Goal Directed] : linear/goal directed [None Reported] : none reported [Average] : average [Euthymic] : euthymic [WNL] : within normal limits [FreeTextEntry1] : Slender [FreeTextEntry5] : Pleasant [FreeTextEntry8] : "much better" [de-identified] : Possible deficits in short term memory and attention/concentration

## 2025-05-29 NOTE — REVIEW OF SYSTEMS
[Negative] : Allergic/Immunologic [de-identified] : occasional dizziness, seizures [de-identified] : see note

## 2025-05-29 NOTE — SOCIAL HISTORY
[FreeTextEntry1] : Has two sisters, she is the youngest. 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 Was previously taking classes at a college to finish her high school degree but stopped 2/2 worsening anxiety. 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 Has friends and supportive colleagues at work. Self identifies as Amish. Originally from [place]. Currently single, no dependents, last had a long term relationship 3 years ago.

## 2025-05-29 NOTE — RISK ASSESSMENT
[Clinical Interview] : Clinical Interview [No] : No [Mood disorder] : mood disorder [Severe anxiety, agitation or panic] : severe anxiety, agitation or panic [Chronic pain/other acute medical condition] : chronic pain or other acute medical condition [Identifies reasons for living] : identifies reasons for living [Mosque beliefs] : Gnosticism beliefs [Cultural, spiritual and/or moral attitudes against suicide] : cultural, spiritual and/or moral attitudes against suicide [None Known] : none known [No known risk factors] : No known risk factors [Residential stability] : residential stability [Sobriety] : sobriety

## 2025-05-29 NOTE — PHYSICAL EXAM
[None] : none [Well groomed] : well groomed [Cooperative] : cooperative [Full] : full [Clear] : clear [Linear/Goal Directed] : linear/goal directed [None Reported] : none reported [Average] : average [Euthymic] : euthymic [WNL] : within normal limits [FreeTextEntry1] : Slender [FreeTextEntry5] : Pleasant [FreeTextEntry8] : "much better" [de-identified] : Possible deficits in short term memory and attention/concentration

## 2025-05-29 NOTE — DISCUSSION/SUMMARY
[Low acute suicide risk] : Low acute suicide risk [No] : No [FreeTextEntry1] : Patient's chronic risk factors for harm to self include an anxious temperament, history of DEANNA, history of seizure disorder and stroke. Her modifiable risk factors include current anxiety with panic attacks, financial difficulties, sleep difficulties. Protective factors include her Episcopal, family connections, having supportive colleagues, being future oriented, and being treatment seeking.

## 2025-05-29 NOTE — REVIEW OF SYSTEMS
[Negative] : Allergic/Immunologic [de-identified] : occasional dizziness, seizures [de-identified] : see note

## 2025-06-10 NOTE — PLAN
[FreeTextEntry2] : Goal 1. The patient will recognize and improve daily symptoms of her anxiety and depression as evidenced by an improvement in scores on mental health scales reviewed in therapy sessions every 1 year. Also, the therapist and the patient will be working on the patient's planning.  Obj 1- The patient will attend biweekly therapy sessions and will review symptoms and coping skills during sessions.   Obj 2- The patient will take daily meds as prescribed and meet with psychiatrist as scheduled  [Cognitive Processing Therapy] : Cognitive Processing Therapy  [Interpersonal Therapy] : Interpersonal Therapy  [Motivational Interviewing] : Motivational Interviewing  [Psychodynamic Therapy] : Psychodynamic Therapy  [Psychoeducation] : Psychoeducation  [Skills training (all types)] : Skills training (all types)  [Supportive Therapy] : Supportive Therapy [de-identified] : The patient came in person for her session, was 15 minutes late, and apologized for it. The patient reported mood improvement, stating that medication and therapy do work. Also, the patient stated that her sleep is ok, and she sleeps 7-8 hours. Whenever the patient feels down, she plays her guitar, walks in the community, talks with her friends over the phone, and meets them. In addition, the patient is getting her p/t at [facility] and is planning to go to work 2 times this week. At times the patient has a hard time explaining herself and is looking for words. The patient's goal is to get GED in the future and to be healthy. The patient confirmed that she is more energetic, and her motivation has increased.  The patient takes medications as prescribed. Throughout the session, the patient was provided with active listening, motivational interviewing, emotional support, and empathy  [Recommended Frequency of Visits: ____] : Recommended frequency of visits: [unfilled] [Return in ____ week(s)] : Return in [unfilled] week(s) [FreeTextEntry1] : the therapist will see the patient every 2-3 weeks

## 2025-06-10 NOTE — END OF VISIT
[Duration of Psychotherapy Visit (minutes spent in synchronous communication): ____] : Duration of Psychotherapy Visit (minutes spent in synchronous communication): [unfilled] [Teletherapy Service Provided] : The services provided in this session were delivered via tele-therapy [Individual Psychotherapy for 38-52 minutes] : Individual Psychotherapy for 38 - 52 minutes [FreeTextEntry3] : home [FreeTextEntry5] : office

## 2025-06-10 NOTE — PLAN
[FreeTextEntry2] : Goal 1. The patient will recognize and improve daily symptoms of her anxiety and depression as evidenced by an improvement in scores on mental health scales reviewed in therapy sessions every 1 year. Also, the therapist and the patient will be working on the patient's planning.  Obj 1- The patient will attend biweekly therapy sessions and will review symptoms and coping skills during sessions.   Obj 2- The patient will take daily meds as prescribed and meet with psychiatrist as scheduled  [Cognitive Processing Therapy] : Cognitive Processing Therapy  [Interpersonal Therapy] : Interpersonal Therapy  [Motivational Interviewing] : Motivational Interviewing  [Psychodynamic Therapy] : Psychodynamic Therapy  [Psychoeducation] : Psychoeducation  [Skills training (all types)] : Skills training (all types)  [Supportive Therapy] : Supportive Therapy [de-identified] : The patient came in person for her session, was 15 minutes late, and apologized for it. The patient reported mood improvement, stating that medication and therapy do work. Also, the patient stated that her sleep is ok, and she sleeps 7-8 hours. Whenever the patient feels down, she plays her guitar, walks in the community, talks with her friends over the phone, and meets them. In addition, the patient is getting her p/t at [facility] and is planning to go to work 2 times this week. At times the patient has a hard time explaining herself and is looking for words. The patient's goal is to get GED in the future and to be healthy. The patient confirmed that she is more energetic, and her motivation has increased.  The patient takes medications as prescribed. Throughout the session, the patient was provided with active listening, motivational interviewing, emotional support, and empathy  [Recommended Frequency of Visits: ____] : Recommended frequency of visits: [unfilled] [Return in ____ week(s)] : Return in [unfilled] week(s) [FreeTextEntry1] : the therapist will see the patient every 2-3 weeks

## 2025-06-30 NOTE — END OF VISIT
[Duration of Psychotherapy Visit (minutes spent in synchronous communication): ____] : Duration of Psychotherapy Visit (minutes spent in synchronous communication): [unfilled] [Individual Psychotherapy for 38-52 minutes] : Individual Psychotherapy for 38 - 52 minutes [Teletherapy Service Provided] : The services provided in this session were delivered via tele-therapy [FreeTextEntry3] : office [FreeTextEntry5] : office

## 2025-06-30 NOTE — PLAN
[FreeTextEntry2] : Goal 1. The patient will recognize and improve daily symptoms of her anxiety and depression as evidenced by an improvement in scores on mental health scales reviewed in therapy sessions every 1 year. Also, the therapist and the patient will be working on the patient's planning.  Obj 1- The patient will attend biweekly therapy sessions and will review symptoms and coping skills during sessions.   Obj 2- The patient will take daily meds as prescribed and meet with psychiatrist as scheduled  [Cognitive Processing Therapy] : Cognitive Processing Therapy  [Interpersonal Therapy] : Interpersonal Therapy  [Motivational Interviewing] : Motivational Interviewing  [Psychodynamic Therapy] : Psychodynamic Therapy  [Psychoeducation] : Psychoeducation  [Skills training (all types)] : Skills training (all types)  [Supportive Therapy] : Supportive Therapy [de-identified] : The  patient came for a therapy session in person. The patient reported that her anxiety and depression is 5/10 partially because she was stressed out by her best friends wedding since it was an important day for her. During the session the patient spoke about her relationship with her sister and a couple other friends, spoke about her expectations about the relationships and things she was uncomfortable with in these relationships. The patient's plan is to keep herself busy, and accepting people for who they are might help the patient to diminish her depression and anxiety. The patient continues to look for words while she speaks, stops focusing on the topic of the conversation. However, overall, the patient feels that she is calmer now apart from her poor sleep and waking up at night multiple times, " i think that was due to my anxiousness before the wedding." The patient takes medications as prescribed. Throughout the session, the patient was provided with active listening, motivational interviewing, emotional support, and empathy   *** Homework- the patient will make a list of triggers  [Recommended Frequency of Visits: ____] : Recommended frequency of visits: [unfilled] [Return in ____ week(s)] : Return in [unfilled] week(s) [FreeTextEntry1] : the therapist will see the patient every 2-3 weeks

## 2025-07-08 NOTE — PLAN
[FreeTextEntry2] : Goal 1. The patient will recognize and improve daily symptoms of her anxiety and depression as evidenced by an improvement in scores on mental health scales reviewed in therapy sessions every 1 year. Also, the therapist and the patient will be working on the patient's planning.  Obj 1- The patient will attend biweekly therapy sessions and will review symptoms and coping skills during sessions.   Obj 2- The patient will take daily meds as prescribed and meet with psychiatrist as scheduled  [Cognitive Processing Therapy] : Cognitive Processing Therapy  [Interpersonal Therapy] : Interpersonal Therapy  [Motivational Interviewing] : Motivational Interviewing  [Psychodynamic Therapy] : Psychodynamic Therapy  [Psychoeducation] : Psychoeducation  [Skills training (all types)] : Skills training (all types)  [Supportive Therapy] : Supportive Therapy [de-identified] : The therapist met the patient in person for the session. The patient continues to attend P/T at [facility] weekly, reports mood up and down and continues to focus on her relationship with her sisters. The patient continues to report some dizziness and has a hard time concentrating during her sessions. During the session the patient spoke about the events she attended with her sisters and other family members, spoke about her hometown in [place] and her life experiences. Overall, the patient is at her baseline and there are no new issues or concerns. The patient takes medications as prescribed. Throughout the session, the patient was provided with active listening, motivational interviewing, emotional support, and empathy   *** Homework- the patient will make a list of triggers  [Recommended Frequency of Visits: ____] : Recommended frequency of visits: [unfilled] [Return in ____ week(s)] : Return in [unfilled] week(s) [FreeTextEntry1] : the therapist will see the patient every 2-3 weeks

## 2025-07-14 NOTE — SOCIAL HISTORY
[FreeTextEntry1] : Never a smoker Has two sisters, she is the youngest. 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 Was previously taking classes at a college to finish her high school degree but stopped 2/2 worsening anxiety. Plays guitar for fun. Has friends and supportive colleagues at work. Self identifies as Hoahaoism. Originally from [place]. Currently single, no dependents, last had a long term relationship 3 years ago.

## 2025-07-14 NOTE — DISCUSSION/SUMMARY
[FreeTextEntry1] : 37 yo woman, domiciled in private residence, single, has no dependents, from [place], employed in hair salon with PMH of epilepsy, TBI, stroke in 2015 and PPH of DEANNA and MDD in remission. Has one prior hospitalization at age 26 for suicidality, no SA, history of being in outpatient treatment a few years ago and treated with Zoloft 100 mg, who presents for follow up appointment.  Pt presents with improved mood and anxiety with medical illness improvement and active psychiatric care. Shares medications have been helpful, has no side effects. Denies SI/HI. Has a strong supportive network and able to relay appropriate copy skills and resources.  Pt is low risk to self and others. Appropriate for outpatient level of care.

## 2025-07-14 NOTE — DISCUSSION/SUMMARY
[FreeTextEntry1] : 35 yo woman, domiciled in private residence, single, has no dependents, from [place], employed in hair salon with PMH of epilepsy, TBI, stroke in 2015 and PPH of DEANNA and MDD in remission. Has one prior hospitalization at age 26 for suicidality, no SA, history of being in outpatient treatment a few years ago and treated with Zoloft 100 mg, who presents for follow up appointment.  Pt presents with improved mood and anxiety with medical illness improvement and active psychiatric care. Shares medications have been helpful, has no side effects. Denies SI/HI. Has a strong supportive network and able to relay appropriate copy skills and resources.  Pt is low risk to self and others. Appropriate for outpatient level of care.

## 2025-07-14 NOTE — PHYSICAL EXAM
[None] : none [Well groomed] : well groomed [Average] : average [Cooperative] : cooperative [Euthymic] : euthymic [Full] : full [Clear] : clear [Linear/Goal Directed] : linear/goal directed [WNL] : within normal limits [de-identified] : initially avoidant but improved, made good eye contact [FreeTextEntry8] : "Good, I'm better" [de-identified] : laughing, making jokes. congruent with mood

## 2025-07-14 NOTE — SOCIAL HISTORY
[FreeTextEntry1] : Never a smoker Has two sisters, she is the youngest. 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 Was previously taking classes at a college to finish her high school degree but stopped 2/2 worsening anxiety. Plays guitar for fun. Has friends and supportive colleagues at work. Self identifies as Gnosticism. Originally from [place]. Currently single, no dependents, last had a long term relationship 3 years ago.

## 2025-07-14 NOTE — PLAN
[No] : No [Medication education provided] : Medication education provided. [Rationale for medication choices, possible risks/precautions, benefits, alternative treatment choices, and consequences of non-treatment discussed] : Rationale for medication choices, possible risks/precautions, benefits, alternative treatment choices, and consequences of non-treatment discussed with patient/family/caregiver  [FreeTextEntry5] : Plan: - Continue Zoloft to 100mg for DEANNA with panic attacks - Continue therapy with [facility] - Follow up in 6 weeks

## 2025-07-14 NOTE — PHYSICAL EXAM
[None] : none [Well groomed] : well groomed [Average] : average [Cooperative] : cooperative [Euthymic] : euthymic [Full] : full [Clear] : clear [Linear/Goal Directed] : linear/goal directed [WNL] : within normal limits [de-identified] : initially avoidant but improved, made good eye contact [FreeTextEntry8] : "Good, I'm better" [de-identified] : laughing, making jokes. congruent with mood

## 2025-07-14 NOTE — HISTORY OF PRESENT ILLNESS
[FreeTextEntry1] : Presents in person for follow up appointment.   Consent to in person/virtual supervision during appointments.    Pt is pleasant and calm. Initially makes poor eye contact, staring at wall.   Pt says she is doing "okay" on medications. Pt endorses feeling "bad" prior to medications. Says she didn't want to do things, was withdrawn, easily irritable, decrease in appetite and sleep (was getting 4 hours of sleep). Pt says she is feeling better now, eating more, mood has improved, more social, working more to get out of the house. Relays she enjoys talking to co-workers and people at work.   Relates her anxiety is better, some days may be a little worse but is "I'm happy."  Pt denies current hopeless, helpless, SI/HI. Says she has past history of hopelessness and passive SI in 2017because she was not doing well physically but is future oriented and relays on gabe (praying).  Endorses fair sleep and appetite now. Denies any other depressive, manic or psychotic sxs.   [FreeTextEntry2] : PPHx of DEANNA and MDD in remission with one prior IPP at age 26 for suicidality, history of being in outpatient treatment a few years ago and treated with Zoloft 100 mg, no history of suicide attempts.   [FreeTextEntry3] : Zoloft 100 mg.

## 2025-07-15 NOTE — PHYSICAL EXAM
[General Appearance - In No Acute Distress] : in no acute distress [General Appearance - Well Nourished] : well nourished [General Appearance - Well Developed] : well developed [Oriented To Time, Place, And Person] : oriented to person, place, and time [Impaired Insight] : insight and judgment were intact [Affect] : the affect was normal [Memory Recent] : recent memory was not impaired [Person] : oriented to person [Place] : oriented to place [Time] : oriented to time [Concentration Intact] : normal concentrating ability [Visual Intact] : visual attention was ~T not ~L decreased [Naming Objects] : no difficulty naming common objects [Repeating Phrases] : no difficulty repeating a phrase [Writing A Sentence] : no difficulty writing a sentence [Fluency] : fluency intact [Comprehension] : comprehension intact [Reading] : reading intact [Past History] : adequate knowledge of personal past history [Cranial Nerves Optic (II)] : visual acuity intact bilaterally,  visual fields full to confrontation, pupils equal round and reactive to light [Cranial Nerves Oculomotor (III)] : extraocular motion intact [Cranial Nerves Trigeminal (V)] : facial sensation intact symmetrically [Cranial Nerves Facial (VII)] : face symmetrical [Cranial Nerves Vestibulocochlear (VIII)] : hearing was intact bilaterally [Cranial Nerves Glossopharyngeal (IX)] : tongue and palate midline [Cranial Nerves Accessory (XI - Cranial And Spinal)] : head turning and shoulder shrug symmetric [Cranial Nerves Hypoglossal (XII)] : there was no tongue deviation with protrusion [Motor Tone] : muscle tone was normal in all four extremities [Motor Strength] : muscle strength was normal in all four extremities [No Muscle Atrophy] : normal bulk in all four extremities [Sensation Tactile Decrease] : light touch was intact [Abnormal Walk] : normal gait [Balance] : balance was intact [2+] : Ankle jerk left 2+ [Sclera] : the sclera and conjunctiva were normal [PERRL With Normal Accommodation] : pupils were equal in size, round, reactive to light, with normal accommodation [Optic Disc Abnormality] : the optic disc were normal in size and color [Outer Ear] : the ears and nose were normal in appearance [Hearing Threshold Finger Rub Not [place]] : hearing was normal [Examination Of The Oral Cavity] : the lips and gums were normal [Both Tympanic Membranes Were Examined] : both tympanic membranes were normal [Nasal Cavity] : the nasal mucosa and septum were normal [Oropharynx] : the oropharynx was normal [Neck Appearance] : the appearance of the neck was normal [Thyroid Diffuse Enlargement] : the thyroid was not enlarged [Respiration, Rhythm And Depth] : normal respiratory rhythm and effort [Auscultation Breath Sounds / Voice Sounds] : lungs were clear to auscultation bilaterally [Apical Impulse] : the apical impulse was normal [Heart Sounds] : normal S1 and S2 [Arterial Pulses Carotid] : carotid pulses were normal with no bruits [Abdominal Aorta] : the abdominal aorta was normal [Arterial Pulses Femoral] : femoral pulses were normal without bruits [Full Pulse] : the pedal pulses are present [Abdomen Tenderness] : non-tender [] : no hepato-splenomegaly [FreeTextEntry1] :   Mental status: Awake, alert and oriented x3.  Recent and remote memory intact.  Naming, repetition and comprehension intact.  Attention/concentration intact.  No dysarthria, no aphasia.  Fund of knowledge appropriate.   Cranial nerves: Pupils equally round and reactive to light, visual fields full, no nystagmus, extraocular muscles intact, V1 through V3 intact bilaterally and symmetric, face symmetric, hearing intact to finger rub, palate elevation symmetric, tongue was midline.   Motor:  MRC grading 5/5 b/l UE/LE.   strength 5/5.  Normal tone and bulk.  No abnormal movements.   Sensation: Intact to light touch, proprioception, and pinprick.   Coordination: No dysmetria on finger-to-nose and heel-to-shin.  No dysdiadochokinesia.   Reflexes: 2+ in bilateral UE/LE, downgoing toes bilaterally. (-) Miller.   Gait: Narrow and steady. No ataxia.  Romberg negative  [Past-pointing] : there was no past-pointing [Tremor] : no tremor present [Plantar Reflex Right Only] : normal on the right [Plantar Reflex Left Only] : normal on the left

## 2025-07-15 NOTE — ASSESSMENT
[FreeTextEntry1] : Ms. Cain is a 35 y/o R-handed F with a PMHx of seizure disorder who presents for follow-up. No seizure activity or events since her prior visit, and the patient is tolerating her Keppra well with full compliance.  # Bitemporal epilepsy, PNES - Last event in January 2025, frequent PNES, one was recorded on vEEG during last admission Jan 29, 2025 - c/w Keppra 500 mg AM and 750 mg PM   - c/w Psychiatry follow-up - c/w Zoloft 100mg PO qd - Continue Vitamin B6 250 PO (SE of Keppra) - Obtain MRI brain Epilepsy protocol when finances allow (currently unemployed on sliding scale). In the meantime, patient to provide prior MRI from [facility] in 6 months  Seizure Safety Instructions 1. [facility] law mandates you to self-report your seizure disorder to DM, and be seizure-free for 1yr before you can drive.  2. Avoid swimming, diving, taking a bath, cooking, use of motorized machineries. 3. Avoid climbing a ladder, trees or any height. 4. Use machines with safety switches. 5. Always be aware of your surroundings and make sure family and friends are aware of your seizures. 6. Use non-breakable dishes. 7. Consider wearing a medical bracelet to inform people you have epilepsy in case of an emergency.

## 2025-07-15 NOTE — HISTORY OF PRESENT ILLNESS
[FreeTextEntry1] : Ms. Cain is a 37 y/o R-handed F with a PMHx of seizure disorder who presents for follow-up. She was last seen here in our clinic in 03/2025. She had at that time undergone vEEG monitoring at the [facility] in 01/2025, with one event recorded. Since March, the patient has not had any new seizure activity or change to her AEDs. She did start seeing a psychiatrist per our recommendations, and is now taking Zoloft 100mg qd. The patient was unable to locate her prior MRI records from [facility] yet, and she was unable to obtain a new outpatient MRI due to insurance/cost issues. She is otherwise feeling well and denies any new or acute complaints; specifically, she denies headaches, dizziness, changes in bowel/bladder habits, and seizure. She presented at [facility] ED in March after her visit with us for a dermatological issue which has since resolved. She is tolerating the Keppra well and denies any side effects.  [facility] admission: Recorded one event lasting 20 min. 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 Since last visit the AEEG -24h was done: Focal slowing L posterior temporal region w superimposed epileptiform dc consistent with focal bitemporal epilepsy. Had URI w bronchitis, still having ABx (Amoxicillin), cough meds.   Events: she feels she had 1-2 events in the last weeks of November 2024 when she was having her URI: feels tired, and in one occasion passed out. Very frequent (1-2 times a week) feels some episodes of "hearing things", anxiety, fear and "weird sensation". Feeling she needs therapist although she goes to [employer] regularly.    Previous chart: TBI when she was 6 y old. Her sz started on 2015 in the context of a fever, recently admitted on [facility], EEG and brain MRI unremarkable during the admission, was on Keppra and recently restarted transiently, 5-7 episodes in entire life, semiology is occipital and holocephalic headache, then loss of balance, lightheaded, tunnel vision, and then the whole body rigid and shaking, then patient does not remember anything, then feeling tired) and depression; with PFHx of seizures in her mother (was on medications) is here for a recent admission for seizure-like episode.. She was taking 500 mg of Keppra but stopped taking Keppra in 2019 (last seizure October 2016). Her sister - may has some seizures as well 2/2 TBI as well.

## 2025-07-16 NOTE — END OF VISIT
[Duration of Psychotherapy Visit (minutes spent in synchronous communication): ____] : Duration of Psychotherapy Visit (minutes spent in synchronous communication): [unfilled] [Individual Psychotherapy for 38-52 minutes] : Individual Psychotherapy for 38 - 52 minutes For information on Fall & Injury Prevention, visit: [email or web address]  [email or web address] OR  https://www.cdc.gov/steadi/patient.html [Teletherapy Service Provided] : The services provided in this session were delivered via tele-therapy [FreeTextEntry3] : office [FreeTextEntry5] : office

## 2025-07-16 NOTE — PLAN
[FreeTextEntry2] : Goal 1. The patient will recognize and improve daily symptoms of her anxiety and depression as evidenced by an improvement in scores on mental health scales reviewed in therapy sessions every 1 year. Also, the therapist and the patient will be working on the patient's planning.  Obj 1- The patient will attend biweekly therapy sessions and will review symptoms and coping skills during sessions.   Obj 2- The patient will take daily meds as prescribed and meet with psychiatrist as scheduled  [Cognitive Processing Therapy] : Cognitive Processing Therapy  [Interpersonal Therapy] : Interpersonal Therapy  [Motivational Interviewing] : Motivational Interviewing  [Psychodynamic Therapy] : Psychodynamic Therapy  [Psychoeducation] : Psychoeducation  [Skills training (all types)] : Skills training (all types)  [Supportive Therapy] : Supportive Therapy [de-identified] : The therapist met the patient in person for the session. The patient continues to have a hard time concentrating and staying on the topic of the conversation at times. Discussed her being upset because her sister's vacation had ended and she went back to her home state. The therapist and the patient also discussed her mood, and the patient stated that "when my sister's mood is ok my mood is good" Additionally, the patient identified some of the triggers and one of them is medical bills the patient is getting. The patient was not clear about the situation but agreed to bring her bills for the therapist to assist the patient calling to clarify the situation and to provide the patient's emergency Medicaid info, so the patient stops receiving bills. Furthermore, the patient shared that she feels that psych meds and therapy work well for her and she is happy about it. The patient's sleep is ok and she keeps herself busy with part time job, reading, playing guitar and going to [employer]. The therapist and the patient reviewed some of the coping skills . The patient takes medications as prescribed. Throughout the session, the patient was provided with active listening, motivational interviewing, emotional support, and empathy  [Recommended Frequency of Visits: ____] : Recommended frequency of visits: [unfilled] [Return in ____ week(s)] : Return in [unfilled] week(s) [FreeTextEntry1] : the therapist will see the patient every 2-3 weeks

## 2025-07-26 NOTE — ASSESSMENT
[FreeTextEntry1] : Patient is a 33 y/o F with a PMH of seizure disorder, MDD, and acne here for follow up.   #Facial hyperpigmented lesions - early seborrheic keratosis - Reassured patient that lesions are benign/ observe - Patient counselled on use of a daily facial lotion with SPF  #Acne vulgaris on face and Folliculitis on neck/ back: - C/w Cetaphil daily face wash - Clindamycin phosphate 1% external lotion BID as needed for breakouts ; instructed to avoid sun exposure after applying as it can prolong expected hyperpigmentation period post application -Start azelaic acid apply daily -Sunscreen sample/dark spot corrector sample from [place] given in office/ sed including risk of irritation  Return to clinic in 6 months or sooner as needed. Can consider tretinoin if worse.

## 2025-07-26 NOTE — HISTORY OF PRESENT ILLNESS
[FreeTextEntry1] : Follow Up [de-identified] : Patient is a 35 y/o F with a PMH of seizure disorder, MDD, and acne here c/o of irritated skin pimples which leave dark spots on face and neck.   No other associated itchiness, rash, irritation. She also complains worsening of  breakouts that are associated with eating nuts and cheese. For facial lesions she was advised to continue Cetaphil daily face wash and Clindamycin phosphate 1% external lotion BID that helps with breakouts but wanted additional medication to control it better.   ALso f/u facial hyperpigmented lesions  /  early seborrheic keratosis, with no concerning changes or sx.    .

## 2025-07-26 NOTE — HISTORY OF PRESENT ILLNESS
[FreeTextEntry1] : Follow Up [de-identified] : Patient is a 33 y/o F with a PMH of seizure disorder, MDD, and acne here c/o of irritated skin pimples which leave dark spots on face and neck.   No other associated itchiness, rash, irritation. She also complains worsening of  breakouts that are associated with eating nuts and cheese. For facial lesions she was advised to continue Cetaphil daily face wash and Clindamycin phosphate 1% external lotion BID that helps with breakouts but wanted additional medication to control it better.   ALso f/u facial hyperpigmented lesions  /  early seborrheic keratosis, with no concerning changes or sx.    .

## 2025-07-26 NOTE — ASSESSMENT
[FreeTextEntry1] : Patient is a 35 y/o F with a PMH of seizure disorder, MDD, and acne here for follow up.   #Facial hyperpigmented lesions - early seborrheic keratosis - Reassured patient that lesions are benign/ observe - Patient counselled on use of a daily facial lotion with SPF  #Acne vulgaris on face and Folliculitis on neck/ back: - C/w Cetaphil daily face wash - Clindamycin phosphate 1% external lotion BID as needed for breakouts ; instructed to avoid sun exposure after applying as it can prolong expected hyperpigmentation period post application -Start azelaic acid apply daily -Sunscreen sample/dark spot corrector sample from [employer] [facility] given in office/ sed including risk of irritation  Return to clinic in 6 months or sooner as needed. Can consider tretinoin if worse.

## 2025-07-26 NOTE — PHYSICAL EXAM
[FreeTextEntry3] : - face with eryth pap and comedones lower face and follicular eryth papules with hyperpigmentation on neck 2-4 mm - verrucous hyperpigmented papules with regular colors and borders on the cheeks.

## 2025-07-29 NOTE — BEGINNING OF VISIT
[0] : 2) Feeling down, depressed, or hopeless: Not at all (0) [PHQ-2 Negative] : PHQ-2 Negative [PHQ-9 Negative] : PHQ-9 Negative [Never] : Never [With Patient/Caregiver] : with Patient/Caregiver

## 2025-07-30 NOTE — REASON FOR VISIT
[FreeTextEntry4] : 12pm [FreeTextEntry5] : 1230pm [Patient] : Patient [FreeTextEntry1] : therapy f/u

## 2025-07-30 NOTE — END OF VISIT
[Duration of Psychotherapy Visit (minutes spent in synchronous communication): ____] : Duration of Psychotherapy Visit (minutes spent in synchronous communication): [unfilled] [Individual Psychotherapy for 16-37 minutes] : Individual Psychotherapy for 16-37 minutes [Teletherapy Service Provided] : The services provided in this session were delivered via tele-therapy [FreeTextEntry3] : office [FreeTextEntry5] : office

## 2025-07-30 NOTE — PLAN
[FreeTextEntry2] : Goal 1. The patient will recognize and improve daily symptoms of her anxiety and depression as evidenced by an improvement in scores on mental health scales reviewed in therapy sessions every 1 year. Also, the therapist and the patient will be working on the patient's planning.  Obj 1- The patient will attend biweekly therapy sessions and will review symptoms and coping skills during sessions.   Obj 2- The patient will take daily meds as prescribed and meet with psychiatrist as scheduled  [Cognitive Processing Therapy] : Cognitive Processing Therapy  [Interpersonal Therapy] : Interpersonal Therapy  [Motivational Interviewing] : Motivational Interviewing  [Psychodynamic Therapy] : Psychodynamic Therapy  [Psychoeducation] : Psychoeducation  [Skills training (all types)] : Skills training (all types)  [Supportive Therapy] : Supportive Therapy [de-identified] : The therapist met the patient in person for the session. The patient called the therapist to state that she is going to be later for her session since there is a traffic on the road. Originally the therapist was planning for the patient to bring her medical bills for the therapist to make calls while the patient is in the office since the patient has a hard time understanding why she gets billed. Because the patient was late the patient agreed to take care of the bills next time she comes. In most part the patient had nothing particular to discuss, but at the end of the session stated that she has a hard time focusing and finish the tasks she started, the patient clarified that it is not something new but got worse for the past couple of years. The therapist encouraged the patient to talk to the psychiatrist to see if the doctor would recommend the patient getting ADHD assessment or perhaps referral to a neurologist. Overall, the patient was at her baseline, some of the coping skills were discussed including positive reinforcement technique. The patient takes medications as prescribed. Throughout the session, the patient was provided with active listening, motivational interviewing, emotional support, and empathy   [Recommended Frequency of Visits: ____] : Recommended frequency of visits: [unfilled] [Return in ____ week(s)] : Return in [unfilled] week(s) [FreeTextEntry1] : the therapist will see the patient every 2-3 weeks

## 2025-08-15 NOTE — HISTORY OF PRESENT ILLNESS
[de-identified] : 35 yo female with PMHx of seizure disorder and stroke x2  presenting for evaluation of iron deficiency anemia. Patient provided note written by her psychiatrist and PCP noting hx of stroked and suggesting further work up. Recently blood work by PCP noting JOVAN (Ferritin 8, TIBC 374, Iron 48, and Iron sat 13%) and hemoglobin of 12.7, MCV 93. She notes irregular menstruation, cycle lasts 4 days and unable to quantify # pads/tampons used daily). She notes prior bright red blood per rectum that has resolved > 6 months ago. Denies hx of bowel or GI surgeries. Eats balanced diet.   Of note patient also notes hx of stroke in 2015 and 2025, both at [facility] with no records available to review at this time. She is unable to provide further detail on how stroke was treated but advised it was discovered when she was admitted for seizures.  [de-identified] : 7/29/25 35 yo F w/ hx of seizure disorder on keppra, presenting for evaluation due to hx of strokes and newly discovered JOVAN. She has no complaints to offer at this time. Starting PO iron for JOVAN. Antiphospholipid panel for hx of stroke to r/o APLS.

## 2025-08-15 NOTE — HISTORY OF PRESENT ILLNESS
[de-identified] : 35 yo female with PMHx of seizure disorder and stroke x2  presenting for evaluation of iron deficiency anemia. Patient provided note written by her psychiatrist and PCP noting hx of stroked and suggesting further work up. Recently blood work by PCP noting JOVAN (Ferritin 8, TIBC 374, Iron 48, and Iron sat 13%) and hemoglobin of 12.7, MCV 93. She notes irregular menstruation, cycle lasts 4 days and unable to quantify # pads/tampons used daily). She notes prior bright red blood per rectum that has resolved > 6 months ago. Denies hx of bowel or GI surgeries. Eats balanced diet.   Of note patient also notes hx of stroke in 2015 and 2025, both at [facility] with no records available to review at this time. She is unable to provide further detail on how stroke was treated but advised it was discovered when she was admitted for seizures.  [de-identified] : 7/29/25 35 yo F w/ hx of seizure disorder on keppra, presenting for evaluation due to hx of strokes and newly discovered JOVAN. She has no complaints to offer at this time. Starting PO iron for JOVAN. Antiphospholipid panel for hx of stroke to r/o APLS.

## 2025-08-15 NOTE — HISTORY OF PRESENT ILLNESS
[de-identified] : 37 yo female with PMHx of seizure disorder and stroke x2  presenting for evaluation of iron deficiency anemia. Patient provided note written by her psychiatrist and PCP noting hx of stroked and suggesting further work up. Recently blood work by PCP noting JOVAN (Ferritin 8, TIBC 374, Iron 48, and Iron sat 13%) and hemoglobin of 12.7, MCV 93. She notes irregular menstruation, cycle lasts 4 days and unable to quantify # pads/tampons used daily). She notes prior bright red blood per rectum that has resolved > 6 months ago. Denies hx of bowel or GI surgeries. Eats balanced diet.   Of note patient also notes hx of stroke in 2015 and 2025, both at [facility] with no records available to review at this time. She is unable to provide further detail on how stroke was treated but advised it was discovered when she was admitted for seizures.  [de-identified] : 7/29/25 37 yo F w/ hx of seizure disorder on keppra, presenting for evaluation due to hx of strokes and newly discovered JOVAN. She has no complaints to offer at this time. Starting PO iron for JOVAN. Antiphospholipid panel for hx of stroke to r/o APLS.